# Patient Record
Sex: FEMALE | Race: WHITE | NOT HISPANIC OR LATINO | Employment: OTHER | URBAN - METROPOLITAN AREA
[De-identification: names, ages, dates, MRNs, and addresses within clinical notes are randomized per-mention and may not be internally consistent; named-entity substitution may affect disease eponyms.]

---

## 2019-01-09 PROBLEM — K57.92 DIVERTICULITIS: Status: ACTIVE | Noted: 2019-01-09

## 2019-03-22 PROBLEM — F32.9 REACTIVE DEPRESSION: Status: ACTIVE | Noted: 2019-03-22

## 2019-03-22 PROBLEM — K29.00 ACUTE GASTRITIS WITHOUT HEMORRHAGE: Status: ACTIVE | Noted: 2019-03-22

## 2020-06-09 PROBLEM — K57.92 DIVERTICULITIS: Status: RESOLVED | Noted: 2019-01-09 | Resolved: 2020-06-09

## 2020-06-09 PROBLEM — K29.00 ACUTE GASTRITIS WITHOUT HEMORRHAGE: Status: RESOLVED | Noted: 2019-03-22 | Resolved: 2020-06-09

## 2020-06-09 PROBLEM — D13.6 BENIGN NEOPLASM OF PANCREAS: Status: ACTIVE | Noted: 2020-06-09

## 2020-11-02 PROBLEM — Z63.6 CAREGIVER STRESS: Status: ACTIVE | Noted: 2020-11-02

## 2020-11-02 PROBLEM — F41.9 ANXIETY: Status: ACTIVE | Noted: 2020-11-02

## 2022-03-06 PROBLEM — K21.9 GASTROESOPHAGEAL REFLUX DISEASE: Status: ACTIVE | Noted: 2022-03-06

## 2022-03-06 PROBLEM — K86.89 PANCREATIC MASS: Status: ACTIVE | Noted: 2022-03-06

## 2022-05-05 PROBLEM — R05.9 COUGH: Status: ACTIVE | Noted: 2022-05-03

## 2023-03-16 ENCOUNTER — HOSPITAL ENCOUNTER (OUTPATIENT)
Facility: MEDICAL CENTER | Age: 68
End: 2023-03-17
Attending: EMERGENCY MEDICINE | Admitting: HOSPITALIST
Payer: MEDICARE

## 2023-03-16 ENCOUNTER — APPOINTMENT (OUTPATIENT)
Dept: RADIOLOGY | Facility: MEDICAL CENTER | Age: 68
End: 2023-03-16
Payer: MEDICARE

## 2023-03-16 ENCOUNTER — APPOINTMENT (OUTPATIENT)
Dept: CARDIOLOGY | Facility: MEDICAL CENTER | Age: 68
End: 2023-03-16
Payer: MEDICARE

## 2023-03-16 ENCOUNTER — APPOINTMENT (OUTPATIENT)
Dept: RADIOLOGY | Facility: MEDICAL CENTER | Age: 68
End: 2023-03-16
Attending: EMERGENCY MEDICINE
Payer: MEDICARE

## 2023-03-16 DIAGNOSIS — R07.89 OTHER CHEST PAIN: ICD-10-CM

## 2023-03-16 DIAGNOSIS — F41.9 ANXIETY: ICD-10-CM

## 2023-03-16 DIAGNOSIS — R94.31 ABNORMAL EKG: ICD-10-CM

## 2023-03-16 PROBLEM — F33.9 RECURRENT MAJOR DEPRESSIVE DISORDER (HCC): Status: ACTIVE | Noted: 2023-03-16

## 2023-03-16 PROBLEM — R42 DIZZINESS: Status: ACTIVE | Noted: 2023-03-16

## 2023-03-16 LAB
ALBUMIN SERPL BCP-MCNC: 4.5 G/DL (ref 3.2–4.9)
ALBUMIN/GLOB SERPL: 1.7 G/DL
ALP SERPL-CCNC: 93 U/L (ref 30–99)
ALT SERPL-CCNC: 21 U/L (ref 2–50)
ANION GAP SERPL CALC-SCNC: 11 MMOL/L (ref 7–16)
AST SERPL-CCNC: 21 U/L (ref 12–45)
BASOPHILS # BLD AUTO: 0.8 % (ref 0–1.8)
BASOPHILS # BLD: 0.05 K/UL (ref 0–0.12)
BILIRUB SERPL-MCNC: 0.3 MG/DL (ref 0.1–1.5)
BUN SERPL-MCNC: 9 MG/DL (ref 8–22)
CALCIUM ALBUM COR SERPL-MCNC: 9.5 MG/DL (ref 8.5–10.5)
CALCIUM SERPL-MCNC: 9.9 MG/DL (ref 8.5–10.5)
CHLORIDE SERPL-SCNC: 102 MMOL/L (ref 96–112)
CO2 SERPL-SCNC: 26 MMOL/L (ref 20–33)
CREAT SERPL-MCNC: 0.93 MG/DL (ref 0.5–1.4)
EKG IMPRESSION: NORMAL
EOSINOPHIL # BLD AUTO: 0.13 K/UL (ref 0–0.51)
EOSINOPHIL NFR BLD: 2.1 % (ref 0–6.9)
ERYTHROCYTE [DISTWIDTH] IN BLOOD BY AUTOMATED COUNT: 47.1 FL (ref 35.9–50)
EST. AVERAGE GLUCOSE BLD GHB EST-MCNC: 108 MG/DL
GFR SERPLBLD CREATININE-BSD FMLA CKD-EPI: 67 ML/MIN/1.73 M 2
GLOBULIN SER CALC-MCNC: 2.6 G/DL (ref 1.9–3.5)
GLUCOSE SERPL-MCNC: 93 MG/DL (ref 65–99)
HBA1C MFR BLD: 5.4 % (ref 4–5.6)
HCT VFR BLD AUTO: 44.9 % (ref 37–47)
HGB BLD-MCNC: 14.5 G/DL (ref 12–16)
IMM GRANULOCYTES # BLD AUTO: 0.02 K/UL (ref 0–0.11)
IMM GRANULOCYTES NFR BLD AUTO: 0.3 % (ref 0–0.9)
INR PPP: 0.95 (ref 0.87–1.13)
LV EJECT FRACT  99904: 65
LV EJECT FRACT MOD 2C 99903: 60.39
LV EJECT FRACT MOD 4C 99902: 66
LV EJECT FRACT MOD BP 99901: 61.74
LYMPHOCYTES # BLD AUTO: 1.75 K/UL (ref 1–4.8)
LYMPHOCYTES NFR BLD: 27.9 % (ref 22–41)
MAGNESIUM SERPL-MCNC: 2.2 MG/DL (ref 1.5–2.5)
MCH RBC QN AUTO: 29.8 PG (ref 27–33)
MCHC RBC AUTO-ENTMCNC: 32.3 G/DL (ref 33.6–35)
MCV RBC AUTO: 92.2 FL (ref 81.4–97.8)
MONOCYTES # BLD AUTO: 0.54 K/UL (ref 0–0.85)
MONOCYTES NFR BLD AUTO: 8.6 % (ref 0–13.4)
NEUTROPHILS # BLD AUTO: 3.78 K/UL (ref 2–7.15)
NEUTROPHILS NFR BLD: 60.3 % (ref 44–72)
NRBC # BLD AUTO: 0 K/UL
NRBC BLD-RTO: 0 /100 WBC
NT-PROBNP SERPL IA-MCNC: 124 PG/ML (ref 0–125)
PLATELET # BLD AUTO: 231 K/UL (ref 164–446)
PMV BLD AUTO: 9 FL (ref 9–12.9)
POTASSIUM SERPL-SCNC: 3.8 MMOL/L (ref 3.6–5.5)
PROT SERPL-MCNC: 7.1 G/DL (ref 6–8.2)
PROTHROMBIN TIME: 12.6 SEC (ref 12–14.6)
RBC # BLD AUTO: 4.87 M/UL (ref 4.2–5.4)
SODIUM SERPL-SCNC: 139 MMOL/L (ref 135–145)
TROPONIN T SERPL-MCNC: 8 NG/L (ref 6–19)
TROPONIN T SERPL-MCNC: 9 NG/L (ref 6–19)
VIT B12 SERPL-MCNC: 2912 PG/ML (ref 211–911)
WBC # BLD AUTO: 6.3 K/UL (ref 4.8–10.8)

## 2023-03-16 PROCEDURE — 85610 PROTHROMBIN TIME: CPT

## 2023-03-16 PROCEDURE — 36415 COLL VENOUS BLD VENIPUNCTURE: CPT

## 2023-03-16 PROCEDURE — 99285 EMERGENCY DEPT VISIT HI MDM: CPT

## 2023-03-16 PROCEDURE — 93005 ELECTROCARDIOGRAM TRACING: CPT | Performed by: EMERGENCY MEDICINE

## 2023-03-16 PROCEDURE — 83880 ASSAY OF NATRIURETIC PEPTIDE: CPT

## 2023-03-16 PROCEDURE — 700102 HCHG RX REV CODE 250 W/ 637 OVERRIDE(OP): Performed by: EMERGENCY MEDICINE

## 2023-03-16 PROCEDURE — 70450 CT HEAD/BRAIN W/O DYE: CPT

## 2023-03-16 PROCEDURE — 71045 X-RAY EXAM CHEST 1 VIEW: CPT

## 2023-03-16 PROCEDURE — 93306 TTE W/DOPPLER COMPLETE: CPT

## 2023-03-16 PROCEDURE — G0378 HOSPITAL OBSERVATION PER HR: HCPCS

## 2023-03-16 PROCEDURE — 93005 ELECTROCARDIOGRAM TRACING: CPT

## 2023-03-16 PROCEDURE — A9270 NON-COVERED ITEM OR SERVICE: HCPCS | Performed by: EMERGENCY MEDICINE

## 2023-03-16 PROCEDURE — 80053 COMPREHEN METABOLIC PANEL: CPT

## 2023-03-16 PROCEDURE — 83036 HEMOGLOBIN GLYCOSYLATED A1C: CPT

## 2023-03-16 PROCEDURE — 99222 1ST HOSP IP/OBS MODERATE 55: CPT | Mod: FS

## 2023-03-16 PROCEDURE — 83735 ASSAY OF MAGNESIUM: CPT

## 2023-03-16 PROCEDURE — 85025 COMPLETE CBC W/AUTO DIFF WBC: CPT

## 2023-03-16 PROCEDURE — 84484 ASSAY OF TROPONIN QUANT: CPT

## 2023-03-16 PROCEDURE — 82607 VITAMIN B-12: CPT

## 2023-03-16 PROCEDURE — 93306 TTE W/DOPPLER COMPLETE: CPT | Mod: 26 | Performed by: INTERNAL MEDICINE

## 2023-03-16 RX ORDER — ONDANSETRON 2 MG/ML
4 INJECTION INTRAMUSCULAR; INTRAVENOUS EVERY 4 HOURS PRN
Status: DISCONTINUED | OUTPATIENT
Start: 2023-03-16 | End: 2023-03-17 | Stop reason: HOSPADM

## 2023-03-16 RX ORDER — ESCITALOPRAM OXALATE 10 MG/1
20 TABLET ORAL DAILY
Status: DISCONTINUED | OUTPATIENT
Start: 2023-03-17 | End: 2023-03-17

## 2023-03-16 RX ORDER — HYDROXYZINE HYDROCHLORIDE 25 MG/1
25 TABLET, FILM COATED ORAL 3 TIMES DAILY PRN
Status: DISCONTINUED | OUTPATIENT
Start: 2023-03-16 | End: 2023-03-17 | Stop reason: HOSPADM

## 2023-03-16 RX ORDER — HEPARIN SODIUM 5000 [USP'U]/ML
5000 INJECTION, SOLUTION INTRAVENOUS; SUBCUTANEOUS EVERY 8 HOURS
Status: DISCONTINUED | OUTPATIENT
Start: 2023-03-16 | End: 2023-03-17 | Stop reason: HOSPADM

## 2023-03-16 RX ORDER — SODIUM CHLORIDE 9 MG/ML
INJECTION, SOLUTION INTRAVENOUS CONTINUOUS
Status: DISCONTINUED | OUTPATIENT
Start: 2023-03-16 | End: 2023-03-16

## 2023-03-16 RX ORDER — ANTIOX #8/OM3/DHA/EPA/LUT/ZEAX 250-2.5 MG
1 CAPSULE ORAL EVERY MORNING
COMMUNITY

## 2023-03-16 RX ORDER — AMOXICILLIN 250 MG
2 CAPSULE ORAL 2 TIMES DAILY
Status: DISCONTINUED | OUTPATIENT
Start: 2023-03-16 | End: 2023-03-17 | Stop reason: HOSPADM

## 2023-03-16 RX ORDER — OXYCODONE HYDROCHLORIDE 5 MG/1
5 TABLET ORAL
Status: DISCONTINUED | OUTPATIENT
Start: 2023-03-16 | End: 2023-03-17 | Stop reason: HOSPADM

## 2023-03-16 RX ORDER — BUPROPION HYDROCHLORIDE 150 MG/1
150 TABLET, EXTENDED RELEASE ORAL 2 TIMES DAILY
Status: DISCONTINUED | OUTPATIENT
Start: 2023-03-17 | End: 2023-03-17 | Stop reason: HOSPADM

## 2023-03-16 RX ORDER — ONDANSETRON 4 MG/1
4 TABLET, ORALLY DISINTEGRATING ORAL EVERY 4 HOURS PRN
Status: DISCONTINUED | OUTPATIENT
Start: 2023-03-16 | End: 2023-03-17 | Stop reason: HOSPADM

## 2023-03-16 RX ORDER — HYDROMORPHONE HYDROCHLORIDE 1 MG/ML
0.25 INJECTION, SOLUTION INTRAMUSCULAR; INTRAVENOUS; SUBCUTANEOUS
Status: DISCONTINUED | OUTPATIENT
Start: 2023-03-16 | End: 2023-03-17 | Stop reason: HOSPADM

## 2023-03-16 RX ORDER — NITROGLYCERIN 0.4 MG/1
0.4 TABLET SUBLINGUAL
Status: DISCONTINUED | OUTPATIENT
Start: 2023-03-16 | End: 2023-03-17 | Stop reason: HOSPADM

## 2023-03-16 RX ORDER — OXYCODONE HYDROCHLORIDE 5 MG/1
2.5 TABLET ORAL
Status: DISCONTINUED | OUTPATIENT
Start: 2023-03-16 | End: 2023-03-17 | Stop reason: HOSPADM

## 2023-03-16 RX ORDER — BISACODYL 10 MG
10 SUPPOSITORY, RECTAL RECTAL
Status: DISCONTINUED | OUTPATIENT
Start: 2023-03-16 | End: 2023-03-17 | Stop reason: HOSPADM

## 2023-03-16 RX ORDER — ACETAMINOPHEN 325 MG/1
650 TABLET ORAL EVERY 6 HOURS PRN
Status: DISCONTINUED | OUTPATIENT
Start: 2023-03-16 | End: 2023-03-17 | Stop reason: HOSPADM

## 2023-03-16 RX ORDER — LABETALOL HYDROCHLORIDE 5 MG/ML
10 INJECTION, SOLUTION INTRAVENOUS EVERY 4 HOURS PRN
Status: DISCONTINUED | OUTPATIENT
Start: 2023-03-16 | End: 2023-03-17 | Stop reason: HOSPADM

## 2023-03-16 RX ORDER — POLYETHYLENE GLYCOL 3350 17 G/17G
1 POWDER, FOR SOLUTION ORAL
Status: DISCONTINUED | OUTPATIENT
Start: 2023-03-16 | End: 2023-03-17 | Stop reason: HOSPADM

## 2023-03-16 RX ORDER — ASPIRIN 81 MG/1
324 TABLET, CHEWABLE ORAL ONCE
Status: COMPLETED | OUTPATIENT
Start: 2023-03-16 | End: 2023-03-16

## 2023-03-16 RX ADMIN — ASPIRIN 81 MG 162 MG: 81 TABLET ORAL at 16:08

## 2023-03-16 ASSESSMENT — ENCOUNTER SYMPTOMS
DOUBLE VISION: 0
MYALGIAS: 0
WEAKNESS: 1
PALPITATIONS: 0
NAUSEA: 0
DIARRHEA: 0
CHILLS: 0
DIZZINESS: 1
TINGLING: 0
HEARTBURN: 0
COUGH: 0
DEPRESSION: 1
FEVER: 0
ORTHOPNEA: 0
BLURRED VISION: 0
SHORTNESS OF BREATH: 0
NERVOUS/ANXIOUS: 1
VOMITING: 0

## 2023-03-16 ASSESSMENT — HEART SCORE
ECG: NON-SPECIFIC REPOLARIZATION DISTURBANCE
TROPONIN: LESS THAN OR EQUAL TO NORMAL LIMIT
HEART SCORE: 4
AGE: 65+
RISK FACTORS: NO KNOWN RISK FACTORS
HISTORY: MODERATELY SUSPICIOUS

## 2023-03-16 ASSESSMENT — PAIN DESCRIPTION - PAIN TYPE: TYPE: ACUTE PAIN

## 2023-03-16 NOTE — ED PROVIDER NOTES
ED Provider Note    CHIEF COMPLAINT  Chief Complaint   Patient presents with    Chest Pain     Started in left shoulder going into chest and had some feeling of lighheadedness.        EXTERNAL RECORDS REVIEWED  = No records for review.    HPI/ROS  LIMITATION TO HISTORY   None  OUTSIDE HISTORIAN(S):  None    Shonna Cavanaugh is a 68 y.o. female who presents to the emergency department for evaluation of chest pain.  Patient's had several episodes of left-sided chest pain.  Is been left upper breast and left lower chest.  Pain came on at rest.  No tearing pain.  Mild shortness of breath.  She did feel lightheaded.  No nausea no vomiting.  Each episode lasted about 5 to 10 minutes nothing made it better nothing made it worse.  Denies history of PE or DVT.  No history of travel surgery or immobilization.  Denies any heart disease or cardiac work-up.  No high blood pressure diabetes or tobacco use.  No high cholesterol.  Does have a family history.    PAST MEDICAL HISTORY   has a past medical history of Anxiety, Depression, Diverticulitis, and Heart murmur.    SURGICAL HISTORY   has a past surgical history that includes abdominal hysterectomy total; breast implant removal; gastroscopy (5/28/2019); and colonoscopy (5/28/2019).    FAMILY HISTORY  Family History   Problem Relation Age of Onset    Arthritis Mother     Heart Disease Mother         pacemaker, atrial fib    Heart Disease Father     Hypertension Father     Cancer Maternal Grandmother     Heart Disease Maternal Grandfather     Heart Disease Paternal Grandfather        SOCIAL HISTORY  Social History     Tobacco Use    Smoking status: Never    Smokeless tobacco: Never   Vaping Use    Vaping Use: Never used   Substance and Sexual Activity    Alcohol use: Yes     Comment: Moderately    Drug use: No    Sexual activity: Not on file       CURRENT MEDICATIONS  Home Medications       Reviewed by Blanca Oconnor R.N. (Registered Nurse) on 03/16/23 at 1430  Med List Status:  "Partial     Medication Last Dose Status   B Complex Vitamins (B COMPLEX PO)  Active   Budeson-Glycopyrrol-Formoterol (BREZTRI AEROSPHERE) 160-9-4.8 MCG/ACT Aerosol  Active   buPROPion (WELLBUTRIN XL) 300 MG XL tablet  Active   CALCIUM PO  Active   Cholecalciferol (VITAMIN D3 PO)  Active   docosahexanoic acid (OMEGA 3 FA) 1000 MG Cap  Active   docusate sodium (COLACE) 100 MG Cap  Active   escitalopram (LEXAPRO) 20 MG tablet  Active   escitalopram (LEXAPRO) 20 MG tablet  Active   HYDROcodone-acetaminophen (NORCO) 5-325 MG Tab per tablet  Active   MAGNESIUM PO  Active   Omeprazole Magnesium (PRILOSEC OTC PO)  Active   promethazine (PHENERGAN) 12.5 MG tablet  Active   TURMERIC PO  Active                    ALLERGIES  Allergies   Allergen Reactions    Erythromycin      Used in eye and eye became inflamed, was instructed not to use anymore by opthalmologist.     Scallops Vomiting    Shellfish Allergy Vomiting     Scallops    Morphine Nausea       PHYSICAL EXAM  VITAL SIGNS: /82   Pulse 70   Temp 36.8 °C (98.2 °F) (Temporal)   Resp 16   Ht 1.626 m (5' 4\")   Wt 49.7 kg (109 lb 9.1 oz)   SpO2 99%   BMI 18.81 kg/m²    Constitutional: Well developed, Well nourished, No acute distress, Non-toxic appearance.   HENT: Normocephalic, Atraumatic,  Eyes: PERRL, EOMI, Conjunctiva normal, No discharge.   Neck: Normal range of motion, No tenderness, Supple, No stridor.   Cardiovascular: Normal heart rate, Normal rhythm, No murmurs, No rubs, No gallops.   Thorax & Lungs: Normal breath sounds, No respiratory distress, No wheezing, no chest wall tenderness.  Abdomen: Bowel sounds normal, Soft, No tenderness  Skin: Warm, Dry, No erythema, No rash.   Back: No tenderness, No CVA tenderness.  Musculoskeletal: Good range of motion in all major joints.  No asymmetric edema good pulses  Neurologic: Alert,No focal deficits noted.   Psychiatric: Affect normal      DIAGNOSTIC STUDIES / PROCEDURES    Results for orders placed or " performed during the hospital encounter of 03/16/23   CBC with Differential   Result Value Ref Range    WBC 6.3 4.8 - 10.8 K/uL    RBC 4.87 4.20 - 5.40 M/uL    Hemoglobin 14.5 12.0 - 16.0 g/dL    Hematocrit 44.9 37.0 - 47.0 %    MCV 92.2 81.4 - 97.8 fL    MCH 29.8 27.0 - 33.0 pg    MCHC 32.3 (L) 33.6 - 35.0 g/dL    RDW 47.1 35.9 - 50.0 fL    Platelet Count 231 164 - 446 K/uL    MPV 9.0 9.0 - 12.9 fL    Neutrophils-Polys 60.30 44.00 - 72.00 %    Lymphocytes 27.90 22.00 - 41.00 %    Monocytes 8.60 0.00 - 13.40 %    Eosinophils 2.10 0.00 - 6.90 %    Basophils 0.80 0.00 - 1.80 %    Immature Granulocytes 0.30 0.00 - 0.90 %    Nucleated RBC 0.00 /100 WBC    Neutrophils (Absolute) 3.78 2.00 - 7.15 K/uL    Lymphs (Absolute) 1.75 1.00 - 4.80 K/uL    Monos (Absolute) 0.54 0.00 - 0.85 K/uL    Eos (Absolute) 0.13 0.00 - 0.51 K/uL    Baso (Absolute) 0.05 0.00 - 0.12 K/uL    Immature Granulocytes (abs) 0.02 0.00 - 0.11 K/uL    NRBC (Absolute) 0.00 K/uL   Complete Metabolic Panel (CMP)   Result Value Ref Range    Sodium 139 135 - 145 mmol/L    Potassium 3.8 3.6 - 5.5 mmol/L    Chloride 102 96 - 112 mmol/L    Co2 26 20 - 33 mmol/L    Anion Gap 11.0 7.0 - 16.0    Glucose 93 65 - 99 mg/dL    Bun 9 8 - 22 mg/dL    Creatinine 0.93 0.50 - 1.40 mg/dL    Calcium 9.9 8.5 - 10.5 mg/dL    AST(SGOT) 21 12 - 45 U/L    ALT(SGPT) 21 2 - 50 U/L    Alkaline Phosphatase 93 30 - 99 U/L    Total Bilirubin 0.3 0.1 - 1.5 mg/dL    Albumin 4.5 3.2 - 4.9 g/dL    Total Protein 7.1 6.0 - 8.2 g/dL    Globulin 2.6 1.9 - 3.5 g/dL    A-G Ratio 1.7 g/dL   Troponins NOW   Result Value Ref Range    Troponin T 8 6 - 19 ng/L   CORRECTED CALCIUM   Result Value Ref Range    Correct Calcium 9.5 8.5 - 10.5 mg/dL   ESTIMATED GFR   Result Value Ref Range    GFR (CKD-EPI) 67 >60 mL/min/1.73 m 2   EKG   Result Value Ref Range    Report       [facility] Emergency Dept.    Test Date:  2023-03-16  Pt Name:    MARIANNE WHITLEY            Department:  ER  MRN:        [id number]                      Room:  Gender:     Female                       Technician: [id number]  :        1955                   Requested By:ER TRIAGE PROTOCOL  Order #:    [id number]                    Reading MD: KATE CHAN. [facility]    Measurements  Intervals                                Axis  Rate:       68                           P:          94  HI:         176                          QRS:        15  QRSD:       92                           T:          61  QT:         391  QTc:        416    Interpretive Statements  Sinus rhythm  Abnormal R-wave progression, early transition  MINIMAL ST DEPRESSION, INFERIOR LEADS  No previous ECG available for comparison  Electronically Signed On 3- 15:37:35 PDT by KATE CHAN. [facility]          RADIOLOGY  I have independently interpreted the diagnostic imaging associated with this visit and am waiting the final reading from the radiologist.   My preliminary interpretation is as follows:     No pneumonia or pneumothorax.  Radiologist interpretation:     DX-CHEST-PORTABLE (1 VIEW)   Final Result      1.  No acute cardiac or pulmonary abnormalities are identified.   2.  Hyperinflated lungs, suggestive of COPD.            COURSE & MEDICAL DECISION MAKING    ED Observation Status? No; Patient does not meet criteria for ED Observation.     INITIAL ASSESSMENT, COURSE AND PLAN  Care Narrative:     Patient presents emerged department with left-sided chest pain.  Pain comes and goes at rest.  A broad differential diagnosis was considered including but not limited to ACS, pneumonia, pneumothorax, chest wall pain, PE, and dissection.    The patient's work-up with above differential diagnosis labs and imaging and EKG.  Cardiac monitor is ordered, however the patient is in the RAD[facility] unit.  Per nursing report to move her as soon as he possibly can.    Patient is given aspirin because of an abnormal EKG with inferior ST segment depression.  Chest x-ray does  not show pneumonia or pneumothorax.  She does have some hyperinflated lungs.    The patient's clinical history is not suggestive of a dissection per the notes of pneumonia or pneumothorax.  She does not have risk factors for PE and her symptoms do not suggest a PE.    At this point the patient's heart score is 4 for risk factors.  For this reason should be hospitalized because of her abnormal EKG.  She is given aspirin.  Case discussed with the nurse practitioner in the CDU care transferred at that time.    Patient is agreeable to plan.  Questions are answered she is hospitalized in guarded condition.    I requested the patient be placed in the room so she can be examined in a gown appropriately.  Patient given aspirin.  EKG does not show STEMI she is hospitalized in fair condition.        ADDITIONAL PROBLEM LIST    DISPOSITION AND DISCUSSIONS      FINAL DIAGNOSIS  1. Other chest pain    2. Abnormal EKG           Electronically signed by: Monroe Russo M.D., 3/16/2023 3:19 PM

## 2023-03-16 NOTE — ED NOTES
Pharmacy Medication Reconciliation      ~Medication reconciliation updated and complete per patient at bedside   ~Allergies have been verified and updated   ~No oral ABX within the last 30 days  ~Patient home pharmacy :  [facility]

## 2023-03-16 NOTE — ED TRIAGE NOTES
Pt ambulated to triage with   Chief Complaint   Patient presents with    Chest Pain     Started in left shoulder going into chest and had some feeling of lighheadedness.      Pt pain free at this time.  Protocol ordered.  Pt Informed regarding triage process and verbalized understanding to inform triage tech or RN for any changes in condition. Placed in lobby.

## 2023-03-17 ENCOUNTER — PHARMACY VISIT (OUTPATIENT)
Dept: PHARMACY | Facility: MEDICAL CENTER | Age: 68
End: 2023-03-17
Payer: COMMERCIAL

## 2023-03-17 ENCOUNTER — APPOINTMENT (OUTPATIENT)
Dept: RADIOLOGY | Facility: MEDICAL CENTER | Age: 68
End: 2023-03-17
Payer: MEDICARE

## 2023-03-17 VITALS
RESPIRATION RATE: 16 BRPM | HEART RATE: 75 BPM | HEIGHT: 64 IN | SYSTOLIC BLOOD PRESSURE: 125 MMHG | BODY MASS INDEX: 18.71 KG/M2 | TEMPERATURE: 98.4 F | DIASTOLIC BLOOD PRESSURE: 68 MMHG | OXYGEN SATURATION: 95 % | WEIGHT: 109.57 LBS

## 2023-03-17 PROBLEM — R42 DIZZINESS: Status: RESOLVED | Noted: 2023-03-16 | Resolved: 2023-03-17

## 2023-03-17 PROBLEM — R07.89 ATYPICAL CHEST PAIN: Status: RESOLVED | Noted: 2023-03-16 | Resolved: 2023-03-17

## 2023-03-17 LAB
ANION GAP SERPL CALC-SCNC: 10 MMOL/L (ref 7–16)
BUN SERPL-MCNC: 10 MG/DL (ref 8–22)
CALCIUM SERPL-MCNC: 9 MG/DL (ref 8.5–10.5)
CHLORIDE SERPL-SCNC: 106 MMOL/L (ref 96–112)
CHOLEST SERPL-MCNC: 170 MG/DL (ref 100–199)
CO2 SERPL-SCNC: 23 MMOL/L (ref 20–33)
CREAT SERPL-MCNC: 0.79 MG/DL (ref 0.5–1.4)
GFR SERPLBLD CREATININE-BSD FMLA CKD-EPI: 81 ML/MIN/1.73 M 2
GLUCOSE SERPL-MCNC: 93 MG/DL (ref 65–99)
HDLC SERPL-MCNC: 64 MG/DL
LDLC SERPL CALC-MCNC: 95 MG/DL
POTASSIUM SERPL-SCNC: 3.8 MMOL/L (ref 3.6–5.5)
SODIUM SERPL-SCNC: 139 MMOL/L (ref 135–145)
TRIGL SERPL-MCNC: 53 MG/DL (ref 0–149)

## 2023-03-17 PROCEDURE — 80048 BASIC METABOLIC PNL TOTAL CA: CPT

## 2023-03-17 PROCEDURE — 99239 HOSP IP/OBS DSCHRG MGMT >30: CPT | Mod: FS | Performed by: NURSE PRACTITIONER

## 2023-03-17 PROCEDURE — G0378 HOSPITAL OBSERVATION PER HR: HCPCS

## 2023-03-17 PROCEDURE — RXMED WILLOW AMBULATORY MEDICATION CHARGE: Performed by: NURSE PRACTITIONER

## 2023-03-17 PROCEDURE — 78452 HT MUSCLE IMAGE SPECT MULT: CPT

## 2023-03-17 PROCEDURE — A9270 NON-COVERED ITEM OR SERVICE: HCPCS | Performed by: NURSE PRACTITIONER

## 2023-03-17 PROCEDURE — 80061 LIPID PANEL: CPT

## 2023-03-17 PROCEDURE — 700102 HCHG RX REV CODE 250 W/ 637 OVERRIDE(OP): Performed by: NURSE PRACTITIONER

## 2023-03-17 PROCEDURE — 700102 HCHG RX REV CODE 250 W/ 637 OVERRIDE(OP)

## 2023-03-17 PROCEDURE — A9270 NON-COVERED ITEM OR SERVICE: HCPCS

## 2023-03-17 RX ORDER — HYDROXYZINE HYDROCHLORIDE 25 MG/1
25 TABLET, FILM COATED ORAL 3 TIMES DAILY PRN
Qty: 90 TABLET | Refills: 0 | Status: SHIPPED | OUTPATIENT
Start: 2023-03-17 | End: 2023-04-16

## 2023-03-17 RX ORDER — ESCITALOPRAM OXALATE 20 MG/1
40 TABLET ORAL DAILY
Qty: 60 TABLET | Refills: 0 | Status: SHIPPED | OUTPATIENT
Start: 2023-03-18 | End: 2023-04-17

## 2023-03-17 RX ORDER — ESCITALOPRAM OXALATE 10 MG/1
40 TABLET ORAL DAILY
Status: DISCONTINUED | OUTPATIENT
Start: 2023-03-17 | End: 2023-03-17 | Stop reason: HOSPADM

## 2023-03-17 RX ADMIN — BUPROPION HYDROCHLORIDE 150 MG: 150 TABLET, EXTENDED RELEASE ORAL at 05:53

## 2023-03-17 RX ADMIN — ASPIRIN 81 MG: 81 TABLET, COATED ORAL at 05:53

## 2023-03-17 RX ADMIN — ESCITALOPRAM OXALATE 40 MG: 10 TABLET ORAL at 05:53

## 2023-03-17 ASSESSMENT — COPD QUESTIONNAIRES
HAVE YOU SMOKED AT LEAST 100 CIGARETTES IN YOUR ENTIRE LIFE: NO/DON'T KNOW
DO YOU EVER COUGH UP ANY MUCUS OR PHLEGM?: NO/ONLY WITH OCCASIONAL COLDS OR INFECTIONS
DO YOU EVER COUGH UP ANY MUCUS OR PHLEGM?: NO/ONLY WITH OCCASIONAL COLDS OR INFECTIONS
DURING THE PAST 4 WEEKS HOW MUCH DID YOU FEEL SHORT OF BREATH: NONE/LITTLE OF THE TIME
DURING THE PAST 4 WEEKS HOW MUCH DID YOU FEEL SHORT OF BREATH: NONE/LITTLE OF THE TIME
HAVE YOU SMOKED AT LEAST 100 CIGARETTES IN YOUR ENTIRE LIFE: NO/DON'T KNOW
COPD SCREENING SCORE: 2
COPD SCREENING SCORE: 2

## 2023-03-17 NOTE — THERAPY
Physical Therapy Contact Note    Patient Name: Shonna Cavanaugh  Age:  68 y.o., Sex:  female  Medical Record #: [id number]  Today's Date: 3/17/2023    PT Consult received/acknowledged. Pt discussed in rounds, dizziness has been resolving and pt has no mobility concerns to warrant PT eval as she is now able to mobilize without difficulty. Will CX PT orders at this time.    Shelbi Suero, PT, DPT  Ext. [phone]

## 2023-03-17 NOTE — DISCHARGE SUMMARY
"Discharge Summary    CHIEF COMPLAINT ON ADMISSION  Chief Complaint   Patient presents with    Chest Pain     Started in left shoulder going into chest and had some feeling of lighheadedness.        Reason for Admission   CP      Admission Date  3/16/2023    CODE STATUS  Full Code    HPI & HOSPITAL COURSE    Ms. Shonna Cavanaugh is a 68 y.o. female with a PMH of anxiety and depression who presented 3/16/2023 with chest pain that started this morning.     The patient reports that she developed sudden onset of chest pain that is dull in nature.  Pain is in her left upper chest does not radiate. It is intermittent in nature and not aggravated by activity, nor relieved by rest.  She reports that she walked five miles this morning without any shortness of breath or chest pain during her walk. The patient told me that she took a total of four 81 mg aspirins at home for her chest pain, which did not resolve the pain. By the time she presented to the ED her chest pain had resolved.  She did state that she was having some intermittent dizziness, and reported that her legs felt \"weak\" compared to normal or as if they were \"shaky.\" The patient does tell me that her mom  recently, and she is in the middle of selling a house, which has increased her stress level significantly and has worsened her anxiety.  Patient denies any recent viral illnesses, shortness of breath, fatigue, headache, nausea, vomiting, diarrhea.  Denies any prior cardiac history.       Vital signs on presentation are as follows: 98.2 °F, 70, 16, 138/82, 99% on room air.     In the ED, patient's initial troponins negative and flat at 8 and 9.  NT proBNP of 124.  Her twelve-lead EKG showed sinus rhythm, rate 68, with very mild ST depression in inferior leads, with T wave inversions in V1 and V2, QTc of 416.  Her chest x-ray demonstrated no acute cardiac or pulmonary abnormalities, though she does have hyperinflated lungs suggestive of possible COPD.  Her chest " pain has subsided at this time.  At the time of my evaluation, the patient is still complaining of dizziness and weakness in her legs.    Patient monitored overnight with no events noted.  Patient underwent echocardiogram with normal regional wall function, grade 1 diastolic dysfunction, with LVEF approximately 65%, mild aortic's insufficiency and right ventricle systolic pressure at 30 mmHg.  Cardiac stress test was also obtained noting no myocardial infarction or reversible ischemia with normal left ventricular function.    Patient seen and examined prior to being discharged.  Patient denies any chest pain on assessment.  Discussed with patient imaging results.  Patient will be prescribed Lexapro with increase in dose as she has been taking.  Patient also be prescribed Atarax to help with her anxiety.  Patient had recommended follow-up with PCP s/p hospitalization.  Patient to resume all home medications.  All questions and concerns answered prior to being discharged.  Patient discharged home.    Therefore, she is discharged in good and stable condition to home with close outpatient follow-up.    The patient recovered much more quickly than anticipated on admission.    Discharge Date  03/17/23      FOLLOW UP ITEMS POST DISCHARGE  Please call [phone] to schedule PCP appointment for patient.    Required specialty appointments include:       Discharge Instructions per FREDDY SaxenaRRosalineNRosaline    -Follow-up with PCP s/p hospitalization  -Continue all home medications  -Start taking Atarax to help with anxiety    DIET: As tolerated    ACTIVITY: As tolerated    DIAGNOSIS: Chest pain    Return to ER if symptoms persist, chest pain, palpitations, shortness of breath, numbness, tingling, weakness, and high fevers.      DISCHARGE DIAGNOSES  Principal Problem (Resolved):    Atypical chest pain POA: Yes  Active Problems:    Anxiety POA: Yes    Recurrent major depressive disorder (HCC) POA: Yes  Resolved  Problems:    Dizziness POA: Yes      FOLLOW UP  Future Appointments   Date Time Provider Department Center   4/10/2023  9:30 AM [facility]FP [facility]   5/4/2023  8:40 AM [facility] None     Shweta Matute M.D.  [address] [place] [address]  [phone]    Schedule an appointment as soon as possible for a visit in 1 week(s)        MEDICATIONS ON DISCHARGE     Medication List        START taking these medications        Instructions   hydrOXYzine HCl 25 MG Tabs  Commonly known as: ATARAX   Take 1 Tablet by mouth 3 times a day as needed for Anxiety or Itching for up to 30 days.  Dose: 25 mg            CONTINUE taking these medications        Instructions   buPROPion 300 MG XL tablet  Commonly known as: WELLBUTRIN XL   TAKE 1 TABLET BY MOUTH IN  THE MORNING     CALCIUM PO   Take 1 Tablet by mouth every morning.  Dose: 1 Tablet     docosahexanoic acid 1000 MG Caps  Commonly known as: OMEGA 3 FA   Take 1,000 mg by mouth every morning.  Dose: 1,000 mg     escitalopram 20 MG tablet  Start taking on: March 18, 2023  Commonly known as: LEXAPRO   Take 2 Tablets by mouth every day for 30 days.  Dose: 40 mg     MULTIVITAMIN PO   Take 1 Tablet by mouth every morning.  Dose: 1 Tablet     PreserVision AREDS 2 Caps   Take 1 Capsule by mouth every morning.  Dose: 1 Capsule     PROBIOTIC PO   Take 1 Capsule by mouth every morning.  Dose: 1 Capsule              Allergies  Allergies   Allergen Reactions    Erythromycin Unspecified     Used in eye and eye became inflamed, was instructed not to use anymore by opthalmologist.     Morphine Nausea    Scallops Vomiting       DIET  Orders Placed This Encounter   Procedures    Diet Order Diet: Cardiac; Miscellaneous modifications: (optional): No Decaf, No Caffeine(for test)     Standing Status:   Standing     Number of Occurrences:   1     Order Specific Question:   Diet:     Answer:   Cardiac [6]     Order Specific Question:   Miscellaneous modifications:  (optional)     Answer:   No Decaf, No Caffeine(for test) [11]       ACTIVITY  As tolerated.  Weight bearing as tolerated    CONSULTATIONS  NONE    PROCEDURES  NONE    IMAGING  NM-CARDIAC STRESS TEST   Final Result      EC-ECHOCARDIOGRAM COMPLETE W/O CONT   Final Result      CT-HEAD W/O   Final Result      1.  Cerebral atrophy.      2.  White matter lucencies most consistent with small vessel ischemic change versus demyelination or gliosis.      3.  Otherwise, Head CT without contrast with no acute findings. No evidence of acute cerebral infarction, hemorrhage or mass lesion.         DX-CHEST-PORTABLE (1 VIEW)   Final Result      1.  No acute cardiac or pulmonary abnormalities are identified.   2.  Hyperinflated lungs, suggestive of COPD.            LABORATORY  Lab Results   Component Value Date    SODIUM 139 03/17/2023    POTASSIUM 3.8 03/17/2023    CHLORIDE 106 03/17/2023    CO2 23 03/17/2023    GLUCOSE 93 03/17/2023    BUN 10 03/17/2023    CREATININE 0.79 03/17/2023        Lab Results   Component Value Date    WBC 6.3 03/16/2023    HEMOGLOBIN 14.5 03/16/2023    HEMATOCRIT 44.9 03/16/2023    PLATELETCT 231 03/16/2023        Total time of the discharge process took 36 minutes.    ================================================================================================================================================================================================================  Please note that this dictation was created using voice recognition software. I have made every reasonable attempt to correct obvious errors, but there may be errors of grammar and possibly content that I did not discover before finalizing the note.    Electronically signed by:  Dr. BRITT Lambert, DNP, APRN, FNP-C  Hospitalist Services  [facility]  [phone]  [email or web address]  03/17/23                  [phone]

## 2023-03-17 NOTE — ASSESSMENT & PLAN NOTE
"Reports feeling dizzy, as if she is about to pass out, and having \"shaky legs\"  Denies vertigo  Neuro exam within normal limits  CT head showed white matter lucencies consistent with small vessel ischemic changes versus demyelinization, but was without acute intracranial abnormalities  PT consult placed  Vitamin B12 pending  Orthostatic blood pressures  "

## 2023-03-17 NOTE — H&P
"Hospital Medicine History & Physical Note    Date of Service  3/16/2023    Primary Care Physician  Shweta Matute M.D.    Consultants  None    Code Status  Full Code    Chief Complaint  Chief Complaint   Patient presents with    Chest Pain     Started in left shoulder going into chest and had some feeling of lighheadedness.        History of Presenting Illness  Shonna Cavanaugh is a 68 y.o. female with a PMH of anxiety and depression who presented 3/16/2023 with chest pain that started this morning. The patient reports that she developed sudden onset of chest pain that is dull in nature.  Pain is in her left upper chest does not radiate. It is intermittent in nature and not aggravated by activity, nor relieved by rest.  She reports that she walked five miles this morning without any shortness of breath or chest pain during her walk. The patient told me that she took a total of four 81 mg aspirins at home for her chest pain, which did not resolve the pain. By the time she presented to the ED her chest pain had resolved.  She did state that she was having some intermittent dizziness, and reported that her legs felt \"weak\" compared to normal or as if they were \"shaky.\" The patient does tell me that her mom  recently, and she is in the middle of selling a house, which has increased her stress level significantly and has worsened her anxiety.  Patient denies any recent viral illnesses, shortness of breath, fatigue, headache, nausea, vomiting, diarrhea.  Denies any prior cardiac history.      Vital signs on presentation are as follows: 98.2 °F, 70, 16, 138/82, 99% on room air.    In the ED, patient's initial troponins negative and flat at 8 and 9.  NT proBNP of 124.  Her twelve-lead EKG showed sinus rhythm, rate 68, with very mild ST depression in inferior leads, with T wave inversions in V1 and V2, QTc of 416.  Her chest x-ray demonstrated no acute cardiac or pulmonary abnormalities, though she does have hyperinflated " "lungs suggestive of possible COPD.  Her chest pain has subsided at this time.  At the time of my evaluation, the patient is still complaining of dizziness and weakness in her legs.    I discussed the plan of care with patient.    Review of Systems  Review of Systems   Constitutional:  Positive for malaise/fatigue. Negative for chills and fever.   HENT:  Negative for ear pain and tinnitus.    Eyes:  Negative for blurred vision and double vision.   Respiratory:  Negative for cough and shortness of breath.    Cardiovascular:  Positive for chest pain. Negative for palpitations, orthopnea and leg swelling.   Gastrointestinal:  Negative for diarrhea, heartburn, nausea and vomiting.   Genitourinary: Negative.    Musculoskeletal:  Negative for joint pain and myalgias.   Neurological:  Positive for dizziness and weakness (\" My legs feel shaky\"). Negative for tingling.   Psychiatric/Behavioral:  Positive for depression (Worse than normal). The patient is nervous/anxious (Worse than normal).    All other systems reviewed and are negative.    Past Medical History   has a past medical history of Anxiety, Depression, Diverticulitis, and Heart murmur.    Surgical History   has a past surgical history that includes abdominal hysterectomy total; breast implant removal; gastroscopy (5/28/2019); and colonoscopy (5/28/2019).     Family History  family history includes Arthritis in her mother; Cancer in her maternal grandmother; Heart Disease in her father, maternal grandfather, mother, and paternal grandfather; Hypertension in her father.   Family history reviewed with patient. There is no family history that is pertinent to the chief complaint.     Social History   reports that she has never smoked. She has never used smokeless tobacco. She reports current alcohol use. She reports that she does not use drugs.    Allergies  Allergies   Allergen Reactions    Erythromycin Unspecified     Used in eye and eye became inflamed, was " instructed not to use anymore by opthalmologist.     Morphine Nausea    Scallops Vomiting       Medications  Prior to Admission Medications   Prescriptions Last Dose Informant Patient Reported? Taking?   CALCIUM PO 3/16/2023 at 0600 Patient Yes No   Sig: Take 1 Tablet by mouth every morning.   Multiple Vitamin (MULTIVITAMIN PO) 3/16/2023 at 0600 Patient Yes Yes   Sig: Take 1 Tablet by mouth every morning.   Multiple Vitamins-Minerals (PRESERVISION AREDS 2) Cap 3/16/2023 at 0600 Patient Yes Yes   Sig: Take 1 Capsule by mouth every morning.   Probiotic Product (PROBIOTIC PO) 3/16/2023 at 0600 Patient Yes Yes   Sig: Take 1 Capsule by mouth every morning.   buPROPion (WELLBUTRIN XL) 300 MG XL tablet 3/16/2023 at 0600 Patient No No   Sig: TAKE 1 TABLET BY MOUTH IN  THE MORNING   docosahexanoic acid (OMEGA 3 FA) 1000 MG Cap 3/16/2023 at 0600 Patient Yes No   Sig: Take 1,000 mg by mouth every morning.   escitalopram (LEXAPRO) 20 MG tablet 3/16/2023 at 0600 Patient No No   Sig: Take 1 Tablet by mouth every day.      Facility-Administered Medications: None       Physical Exam  Temp:  [36.8 °C (98.2 °F)-37.4 °C (99.3 °F)] 37.3 °C (99.1 °F)  Pulse:  [65-79] 68  Resp:  [] 18  BP: (119-148)/(60-82) 119/60  SpO2:  [94 %-99 %] 97 %  Blood Pressure : 122/67   Temperature: 37.4 °C (99.3 °F)   Pulse: 65   Respiration: (!) 116   Pulse Oximetry: 96 %       Physical Exam  Vitals and nursing note reviewed.   Constitutional:       Appearance: Normal appearance. She is not ill-appearing.   HENT:      Head: Normocephalic and atraumatic.      Nose: Nose normal.      Mouth/Throat:      Mouth: Mucous membranes are moist.   Eyes:      Extraocular Movements: Extraocular movements intact.      Pupils: Pupils are equal, round, and reactive to light.   Cardiovascular:      Rate and Rhythm: Normal rate and regular rhythm.      Pulses: Normal pulses.      Heart sounds: Normal heart sounds. No murmur heard.  Pulmonary:      Effort: Pulmonary  effort is normal. No respiratory distress.      Breath sounds: Normal breath sounds.   Abdominal:      General: Abdomen is flat. Bowel sounds are normal. There is no distension.      Palpations: Abdomen is soft.      Tenderness: There is no abdominal tenderness.   Musculoskeletal:         General: No swelling. Normal range of motion.      Cervical back: Normal range of motion and neck supple.   Skin:     General: Skin is warm and dry.      Capillary Refill: Capillary refill takes less than 2 seconds.   Neurological:      General: No focal deficit present.      Mental Status: She is alert and oriented to person, place, and time.      Motor: No weakness.   Psychiatric:         Mood and Affect: Mood normal.         Behavior: Behavior normal.       Laboratory:  Recent Labs     03/16/23  1436   WBC 6.3   RBC 4.87   HEMOGLOBIN 14.5   HEMATOCRIT 44.9   MCV 92.2   MCH 29.8   MCHC 32.3*   RDW 47.1   PLATELETCT 231   MPV 9.0     Recent Labs     03/16/23  1436   SODIUM 139   POTASSIUM 3.8   CHLORIDE 102   CO2 26   GLUCOSE 93   BUN 9   CREATININE 0.93   CALCIUM 9.9     Recent Labs     03/16/23  1436   ALTSGPT 21   ASTSGOT 21   ALKPHOSPHAT 93   TBILIRUBIN 0.3   GLUCOSE 93     Recent Labs     03/16/23  1436   INR 0.95     Recent Labs     03/16/23  1645   NTPROBNP 124         Recent Labs     03/16/23  1436 03/16/23  1740   TROPONINT 8 9       Imaging:  CT-HEAD W/O   Final Result      1.  Cerebral atrophy.      2.  White matter lucencies most consistent with small vessel ischemic change versus demyelination or gliosis.      3.  Otherwise, Head CT without contrast with no acute findings. No evidence of acute cerebral infarction, hemorrhage or mass lesion.         DX-CHEST-PORTABLE (1 VIEW)   Final Result      1.  No acute cardiac or pulmonary abnormalities are identified.   2.  Hyperinflated lungs, suggestive of COPD.      EC-ECHOCARDIOGRAM COMPLETE W/O CONT    (Results Pending)   NM-CARDIAC STRESS TEST    (Results Pending)  "      X-Ray:  My impression is: No acute cardiopulmonary abnormalities, question hyperinflation lungs suggestive of COPD  EKG:  My impression is: Sinus rhythm, rate 68, with minimal ST depression in inferior leads, and T wave inversions in V1 V2, QTc 416    Assessment/Plan:  Justification for Admission Status  I anticipate this patient is appropriate for observation status at this time because she will likely be able to discharge tomorrow if echocardiogram and stress test are negative.    Patient will need a Telemetry bed on MEDICAL service .  The need is secondary to as below.    * Atypical chest pain- (present on admission)  Assessment & Plan  The ASCVD Risk score (Daphnie GUILLORY, et al., 2019) failed to calculate for the following reasons:    Cannot find a previous HDL lab    Cannot find a previous total cholesterol lab    Atypical chest pain that is intermittent, not aggravated by exercise and not relieved with rest  Associated with dizziness. Denies shortness of breath, chest pressure, nausea, or radiation  Chest pain has resolved on arrival, but the patient is experiencing persistent dizziness/weakness  Patient has significant social stressors including the death of her mother recently and attempting to move and sell her home, which is likely contributing to patient's symptomology  Troponins flat at 8-->9  HEART score 3  EKG demonstrating minimal ST depression in inferior leads, T wave inversions V1 V2    Admit to observation status for echocardiogram and nuclear medicine stress test  Lipid panel   A1c pending    Dizziness- (present on admission)  Assessment & Plan  Reports feeling dizzy, as if she is about to pass out, and having \"shaky legs\"  Denies vertigo  Neuro exam within normal limits  CT head showed white matter lucencies consistent with small vessel ischemic changes versus demyelinization, but was without acute intracranial abnormalities  PT consult placed  Vitamin B12 pending  Orthostatic blood " pressures    Recurrent major depressive disorder (HCC)- (present on admission)  Assessment & Plan  Continue home Wellbutrin and Lexapro    Anxiety- (present on admission)  Assessment & Plan  As needed Atarax  Social work consult for emotional support and coping resources    Follow-up patient PCP for further monitoring      VTE prophylaxis: heparin ppx    --------------------------------------------------------------------------------------------------------------------------  Please note that this dictation was created using voice recognition software. I have made every reasonable attempt to correct obvious errors, but there may be errors of grammar and possibly content that I did not discover before finalizing the note.    Electronically signed by:  Tanya Hardy, KEYA, APRN, TANYAP-BC  Hospitalist Services  [facility]  03/16/23  8:12 PM

## 2023-03-17 NOTE — DISCHARGE INSTRUCTIONS
FOLLOW UP ITEMS POST DISCHARGE  Please call [phone] to schedule PCP appointment for patient.    Required specialty appointments include:       Discharge Instructions per Mao Lambert, JULYPRosalineRRosalineN.    -Follow-up with PCP s/p hospitalization  -Continue all home medications  -Start taking Atarax to help with anxiety    DIET: As tolerated    ACTIVITY: As tolerated    DIAGNOSIS: Chest pain    Return to ER if symptoms persist, chest pain, palpitations, shortness of breath, numbness, tingling, weakness, and high fevers.

## 2023-03-17 NOTE — PROGRESS NOTES
Patient arrived to floor in no acute distress, free of sob and chest pain. She is alert and oriented, ambulatory on tele.

## 2023-03-17 NOTE — ASSESSMENT & PLAN NOTE
The ASCVD Risk score (Daphnie GUILLORY, et al., 2019) failed to calculate for the following reasons:    Cannot find a previous HDL lab    Cannot find a previous total cholesterol lab    Atypical chest pain that is intermittent, not aggravated by exercise and not relieved with rest  Associated with dizziness. Denies shortness of breath, chest pressure, nausea, or radiation  Chest pain has resolved on arrival, but the patient is experiencing persistent dizziness/weakness  Patient has significant social stressors including the death of her mother recently and attempting to move and sell her home, which is likely contributing to patient's symptomology  Troponins flat at 8-->9  HEART score 3  EKG demonstrating minimal ST depression in inferior leads, T wave inversions V1 V2    Admit to observation status for echocardiogram and nuclear medicine stress test  Lipid panel   A1c pending

## 2023-03-17 NOTE — PROGRESS NOTES
4 Eyes Skin Assessment Completed by EMANI Hollis and EMANI Seymour.    Head WDL  Ears WDL  Nose WDL  Mouth WDL  Neck WDL  Breast/Chest WDL  Shoulder Blades WDL  Spine WDL  (R) Arm/Elbow/Hand WDL  (L) Arm/Elbow/Hand WDL  Abdomen WDL  Groin WDL  Scrotum/Coccyx/Buttocks WDL  (R) Leg WDL  (L) Leg WDL  (R) Heel/Foot/Toe WDL  (L) Heel/Foot/Toe WDL          Devices In Places Tele Box      Interventions In Place N/A    Possible Skin Injury No    Pictures Uploaded Into Epic N/A  Wound Consult Placed N/A  RN Wound Prevention Protocol Ordered No

## 2023-03-17 NOTE — HOSPITAL COURSE
"Ms. Shonna Cavanaugh is a 68 y.o. female with a PMH of anxiety and depression who presented 3/16/2023 with chest pain that started this morning.     The patient reports that she developed sudden onset of chest pain that is dull in nature.  Pain is in her left upper chest does not radiate. It is intermittent in nature and not aggravated by activity, nor relieved by rest.  She reports that she walked five miles this morning without any shortness of breath or chest pain during her walk. The patient told me that she took a total of four 81 mg aspirins at home for her chest pain, which did not resolve the pain. By the time she presented to the ED her chest pain had resolved.  She did state that she was having some intermittent dizziness, and reported that her legs felt \"weak\" compared to normal or as if they were \"shaky.\" The patient does tell me that her mom  recently, and she is in the middle of selling a house, which has increased her stress level significantly and has worsened her anxiety.  Patient denies any recent viral illnesses, shortness of breath, fatigue, headache, nausea, vomiting, diarrhea.  Denies any prior cardiac history.       Vital signs on presentation are as follows: 98.2 °F, 70, 16, 138/82, 99% on room air.     In the ED, patient's initial troponins negative and flat at 8 and 9.  NT proBNP of 124.  Her twelve-lead EKG showed sinus rhythm, rate 68, with very mild ST depression in inferior leads, with T wave inversions in V1 and V2, QTc of 416.  Her chest x-ray demonstrated no acute cardiac or pulmonary abnormalities, though she does have hyperinflated lungs suggestive of possible COPD.  Her chest pain has subsided at this time.  At the time of my evaluation, the patient is still complaining of dizziness and weakness in her legs.    Patient monitored overnight with no events noted.  Patient underwent echocardiogram with normal regional wall function, grade 1 diastolic dysfunction, with LVEF " approximately 65%, mild aortic's insufficiency and right ventricle systolic pressure at 30 mmHg.  Cardiac stress test was also obtained noting no myocardial infarction or reversible ischemia with normal left ventricular function.    Patient seen and examined prior to being discharged.  Patient denies any chest pain on assessment.  Discussed with patient imaging results.  Patient will be prescribed Lexapro with increase in dose as she has been taking.  Patient also be prescribed Atarax to help with her anxiety.  Patient had recommended follow-up with PCP s/p hospitalization.  Patient to resume all home medications.  All questions and concerns answered prior to being discharged.  Patient discharged home.

## 2023-03-17 NOTE — CARE PLAN
The patient is Stable - Low risk of patient condition declining or worsening         Progress made toward(s) clinical / shift goals:  Patient and family aware of plan of care.       Problem: Knowledge Deficit - Standard  Goal: Patient and family/care givers will demonstrate understanding of plan of care, disease process/condition, diagnostic tests and medications  Outcome: Progressing       Patient is not progressing towards the following goals:

## 2023-03-17 NOTE — PROGRESS NOTES
Patient to be discharged today - patient aware and agreeable to plan. D/c instructions reviewed with patient - ?'s/concerns answered. Meds to beds given to patient, 1 iv removed

## 2023-03-17 NOTE — ASSESSMENT & PLAN NOTE
As needed Atarax  Social work consult for emotional support and coping resources    Follow-up patient PCP for further monitoring